# Patient Record
Sex: MALE | Race: WHITE | NOT HISPANIC OR LATINO | Employment: FULL TIME | URBAN - NONMETROPOLITAN AREA
[De-identification: names, ages, dates, MRNs, and addresses within clinical notes are randomized per-mention and may not be internally consistent; named-entity substitution may affect disease eponyms.]

---

## 2017-01-04 ENCOUNTER — OFFICE VISIT (OUTPATIENT)
Dept: SURGERY | Facility: CLINIC | Age: 47
End: 2017-01-04

## 2017-01-04 VITALS
BODY MASS INDEX: 31.55 KG/M2 | RESPIRATION RATE: 16 BRPM | HEART RATE: 88 BPM | SYSTOLIC BLOOD PRESSURE: 122 MMHG | WEIGHT: 201 LBS | HEIGHT: 67 IN | DIASTOLIC BLOOD PRESSURE: 86 MMHG

## 2017-01-04 DIAGNOSIS — L98.9 HAND LESION: Primary | ICD-10-CM

## 2017-01-04 PROCEDURE — 11423 EXC H-F-NK-SP B9+MARG 2.1-3: CPT | Performed by: SURGERY

## 2017-01-04 RX ORDER — DOXYCYCLINE HYCLATE 100 MG/1
100 CAPSULE ORAL 2 TIMES DAILY
COMMUNITY
End: 2017-01-04

## 2017-01-04 RX ORDER — VITAMIN E 268 MG
400 CAPSULE ORAL DAILY
COMMUNITY

## 2017-01-04 RX ORDER — CLINDAMYCIN HYDROCHLORIDE 300 MG/1
300 CAPSULE ORAL 3 TIMES DAILY
COMMUNITY
End: 2017-01-04

## 2017-01-04 RX ORDER — OMEGA-3 FATTY ACIDS/FISH OIL 300-1000MG
CAPSULE ORAL
COMMUNITY

## 2017-01-04 RX ORDER — FAMOTIDINE 20 MG/1
20 TABLET, FILM COATED ORAL AS NEEDED
COMMUNITY

## 2017-01-04 NOTE — LETTER
"January 4, 2017     Malcolm Neely DO  [address] [place] [address]    Patient: Darion Liao   YOB: 1970   Date of Visit: 1/4/2017       Dear Dr. Chin DO:    Thank you for referring Darion Liao to me for evaluation. Below are the relevant portions of my assessment and plan of care.    If you have questions, please do not hesitate to call me. I look forward to following Darion along with you.         Sincerely,        Brianda Flores DO        CC: No Recipients  Brianda Flores DO  1/4/2017  1:04 PM  Sign at close encounter  Darion Liao 46 y.o. male presents @ the req of Dr. Neely for eval poss spider bite RIGHT alexander.  Pt states he noticed spider bite approx 3 months ago.  Pt states he used a utility knife and \"cut the area out.\" Pt reports the area cont to have + clear drainage.  NO PCP.      HPI   Above noted and agree.      Review of Systems        Past Medical History   Diagnosis Date   • Headache    • Sleep apnea            Past Surgical History   Procedure Laterality Date   • Hand surgery             Physical Exam        Visit Vitals   • /86   • Pulse 88   • Resp 16   • Ht 67\" (170.2 cm)   • Wt 201 lb (91.2 kg)   • BMI 31.48 kg/m2     I discussed with the patient the benefits and risks of excising this lesion.   Risks not limited to but including bleeding, infection, having to excise more if the lesion turns out to be cancer.  The patient appeared to understand and signed informed consent.  The area was prepped and draped in the usual sterile fashion.  Local was injected into the area to be excised.  The lesion was then excised.  Hemostasis was perfected.  The wound was then closed using simple sutures.  Sterile dressings were applied.  The patient tolerated the procedure well without complication.  Wound care instructions were then given to the patient.  The lesion measured 2.5 cm x 1.2 cm x 1 cm and was located on his right hand.      Darion was seen today for wound " check.    Diagnoses and all orders for this visit:    Hand lesion    We will send the lesion to pathology and remove his sutures in one week.  We discussed wound care with him.    Thank you for allowing me to participate in the care of this interesting patient.

## 2017-01-04 NOTE — PROGRESS NOTES
"Darion Liao 46 y.o. male presents @ the req of Dr. Neely for joyceal poss spider bite RIGHT alexander.  Pt states he noticed spider bite approx 3 months ago.  Pt states he used a utility knife and \"cut the area out.\" Pt reports the area cont to have + clear drainage.  NO PCP.      HPI   Above noted and agree.      Review of Systems        Past Medical History   Diagnosis Date   • Headache    • Sleep apnea            Past Surgical History   Procedure Laterality Date   • Hand surgery             Physical Exam        Visit Vitals   • /86   • Pulse 88   • Resp 16   • Ht 67\" (170.2 cm)   • Wt 201 lb (91.2 kg)   • BMI 31.48 kg/m2     I discussed with the patient the benefits and risks of excising this lesion.   Risks not limited to but including bleeding, infection, having to excise more if the lesion turns out to be cancer.  The patient appeared to understand and signed informed consent.  The area was prepped and draped in the usual sterile fashion.  Local was injected into the area to be excised.  The lesion was then excised.  Hemostasis was perfected.  The wound was then closed using simple sutures.  Sterile dressings were applied.  The patient tolerated the procedure well without complication.  Wound care instructions were then given to the patient.  The lesion measured 2.5 cm x 1.2 cm x 1 cm and was located on his right hand.      Darion was seen today for wound check.    Diagnoses and all orders for this visit:    Hand lesion    We will send the lesion to pathology and remove his sutures in one week.  We discussed wound care with him.    Thank you for allowing me to participate in the care of this interesting patient.      "

## 2017-01-11 ENCOUNTER — OFFICE VISIT (OUTPATIENT)
Dept: SURGERY | Facility: CLINIC | Age: 47
End: 2017-01-11

## 2017-01-11 DIAGNOSIS — Z98.890 STATUS POST EXCISIONAL BIOPSY: Primary | ICD-10-CM

## 2017-01-11 PROCEDURE — 99024 POSTOP FOLLOW-UP VISIT: CPT | Performed by: SURGERY

## 2017-01-11 NOTE — PROGRESS NOTES
Darion Liao 46 y.o. male presents for PO FU exc RIGHT hand.  Pt reports + erythema on incision.  NO PCP.      HPI   Above noted and agree.        Review of Systems        Past Medical History   Diagnosis Date   • Headache    • Sleep apnea            Past Surgical History   Procedure Laterality Date   • Hand surgery             Physical Exam    Wound clean dry and intact  Sutures removed  Pathology pending    There were no vitals taken for this visit.        Darion was seen today for suture / staple removal.    Diagnoses and all orders for this visit:    Status post excisional biopsy    We will call him with the pathology when it is available.    Thank you for allowing me to participate in the care of this interesting patient.

## 2017-01-13 ENCOUNTER — OFFICE VISIT (OUTPATIENT)
Dept: SURGERY | Facility: CLINIC | Age: 47
End: 2017-01-13

## 2017-01-13 DIAGNOSIS — T81.31XA WOUND DEHISCENCE, INITIAL ENCOUNTER: Primary | ICD-10-CM

## 2017-01-13 PROCEDURE — 99024 POSTOP FOLLOW-UP VISIT: CPT | Performed by: SURGERY
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
